# Patient Record
Sex: MALE | Race: WHITE
[De-identification: names, ages, dates, MRNs, and addresses within clinical notes are randomized per-mention and may not be internally consistent; named-entity substitution may affect disease eponyms.]

---

## 2019-07-12 ENCOUNTER — HOSPITAL ENCOUNTER (OUTPATIENT)
Dept: HOSPITAL 11 - JP.SDS | Age: 69
Discharge: HOME | End: 2019-07-12
Attending: SURGERY
Payer: MEDICARE

## 2019-07-12 DIAGNOSIS — Z86.010: ICD-10-CM

## 2019-07-12 DIAGNOSIS — E78.5: ICD-10-CM

## 2019-07-12 DIAGNOSIS — Z12.11: Primary | ICD-10-CM

## 2019-07-12 DIAGNOSIS — Z79.82: ICD-10-CM

## 2019-07-12 DIAGNOSIS — I10: ICD-10-CM

## 2019-07-12 DIAGNOSIS — M54.9: ICD-10-CM

## 2019-07-12 DIAGNOSIS — J44.9: ICD-10-CM

## 2019-07-12 DIAGNOSIS — Z79.899: ICD-10-CM

## 2019-07-12 DIAGNOSIS — Z86.73: ICD-10-CM

## 2019-07-12 NOTE — OR
DATE OF PROCEDURE:  07/12/2019

 

SURGEON:  Micheal Thakkar MD

 

PREOPERATIVE DIAGNOSIS:  History of colon polyps.

 

POSTOPERATIVE DIAGNOSES:  Unremarkable colonoscopy, history of colon polyps.

 

PROCEDURE:  Colonoscopy to the cecum.



SURGEON:  Micheal Thakkar MD. 

 

ANESTHESIA:  IV anesthesia with monitored anesthesia care.

 

INDICATION:  This 69-year-old white male is referred for a colonoscopy because 
of a history

of colon polyps.  He says his last colonoscopic exam was done 5 years ago.  I 
counseled him

for the procedure, including risks and alternatives, and he gave his informed 
consent to proceed.

 

DESCRIPTION OF PROCEDURE:  The patient was placed in the left lateral decubitus 
position.

IV anesthesia was administered by the Anesthesia Service.  Time-out was held.  
A rectal 

exam was performed, which was unremarkable.  The flexible video Olympus 
colonoscope 

was introduced through his anus, up his rectum, and out his colon all the way 
to the cecum.

Once the cecum was reached, the scope was slowly withdrawn examining the mucosa 
throughout.

No mucosal abnormalities were noted.  The scope was retroflexed in the rectum 
with the

distal rectum appearing unremarkable.  The scope was straightened and removed.  
He tolerated

the procedure well.

 

 

 

 

Micheal Thakkar MD

DD:  07/12/2019 08:15:05

DT:  07/12/2019 09:08:16

Job #:  [id number]

MTDD

## 2024-09-12 ENCOUNTER — HOSPITAL ENCOUNTER (EMERGENCY)
Dept: HOSPITAL 11 - JP.ED | Age: 74
Discharge: NURSING, REHAB OR LONG-TERM CARE | End: 2024-09-12
Payer: MEDICARE

## 2024-09-12 DIAGNOSIS — J44.9: ICD-10-CM

## 2024-09-12 DIAGNOSIS — Z79.82: ICD-10-CM

## 2024-09-12 DIAGNOSIS — Z79.899: ICD-10-CM

## 2024-09-12 DIAGNOSIS — E78.00: ICD-10-CM

## 2024-09-12 DIAGNOSIS — G45.9: Primary | ICD-10-CM

## 2024-09-12 LAB
ALBUMIN SERPL-MCNC: 3.7 G/DL (ref 3.4–5)
ALBUMIN/GLOB SERPL: 1 {RATIO} (ref 1.2–2.2)
ALP SERPL-CCNC: 71 U/L (ref 46–116)
ALT SERPL-CCNC: 32 U/L (ref 12–78)
ANION GAP SERPL CALC-SCNC: 11.4 MMOL/L (ref 5–14)
APPEARANCE UR: CLEAR
APTT PPP: 30.1 SEC (ref 21.8–27.3)
AST SERPL-CCNC: 30 U/L (ref 15–37)
BACTERIA URNS QL MICRO: (no result)
BASOPHILS # BLD AUTO: 0.01 K/UL (ref 0–0.1)
BASOPHILS NFR BLD AUTO: 0.2 % (ref 0.1–1.3)
BILIRUB SERPL-MCNC: 1 MG/DL (ref 0.2–1)
BILIRUB UR STRIP-MCNC: NEGATIVE MG/DL
BUN SERPL-MCNC: 15 MG/DL (ref 7–18)
CALCIUM SERPL-MCNC: 9.1 MG/DL (ref 8.5–10.1)
CHLORIDE SERPL-SCNC: 96 MMOL/L (ref 100–108)
CO2 SERPL-SCNC: 28 MMOL/L (ref 21–32)
COLOR UR: YELLOW
CREAT CL 24H UR+SERPL-VRATE: 55.27 ML/MIN
CREAT SERPL-MCNC: 1.1 MG/DL (ref 0.8–1.3)
EOSINOPHIL # BLD AUTO: 0 K/UL (ref 0–0.4)
EOSINOPHIL NFR BLD AUTO: 0 % (ref 0–5.4)
EPI CELLS #/AREA URNS HPF: (no result) /[HPF]
FLUAV RNA UPPER RESP QL NAA+PROBE: NEGATIVE
FLUBV RNA UPPER RESP QL NAA+PROBE: NEGATIVE
GLOBULIN SER-MCNC: 3.8 G/DL (ref 2.3–3.5)
GLUCOSE SERPL-MCNC: 133 MG/DL (ref 74–106)
GLUCOSE UR STRIP-MCNC: NEGATIVE MG/DL
HCT VFR BLD AUTO: 41.3 % (ref 38.4–49.7)
HGB BLD-MCNC: 14.3 G/DL (ref 12.9–16.9)
IMM GRANULOCYTES # BLD: 0.02 K/UL (ref 0–0.23)
IMM GRANULOCYTES NFR BLD: 0.5 % (ref 0–0.7)
INR PPP: 1.1
KETONES UR STRIP-MCNC: NEGATIVE MG/DL
LYMPHOCYTES # BLD AUTO: 0.3 K/UL (ref 0.8–3.3)
LYMPHOCYTES NFR BLD AUTO: 6.9 % (ref 11.4–47.7)
MCH RBC QN AUTO: 29.7 PG (ref 31.6–35.5)
MCHC RBC AUTO-ENTMCNC: 34.6 G/DL (ref 31.6–35.5)
MCHC RBC AUTO-ENTMCNC: 85.7 FL (ref 81.4–99)
MONOCYTES # BLD AUTO: 0.37 K/UL (ref 0.2–0.9)
MONOCYTES NFR BLD AUTO: 8.5 % (ref 3.3–12.6)
MUCOUS THREADS URNS QL MICRO: (no result)
NEUTROPHILS # BLD AUTO: 3.64 K/UL (ref 1–7.6)
NEUTROPHILS NFR BLD AUTO: 83.9 % (ref 40–78.1)
NITRITE UR QL: NEGATIVE
PH UR STRIP: 6 [PH] (ref 5–8)
PLATELET # BLD AUTO: 171 K/UL (ref 130–375)
POTASSIUM SERPL-SCNC: 4.4 MMOL/L (ref 3.6–5.2)
PROT SERPL-MCNC: 7.5 G/DL (ref 6.4–8.2)
PROT UR STRIP-MCNC: NEGATIVE MG/DL
PROTHROMBIN TIME: 11 SEC (ref 9.2–10.6)
RBC # BLD AUTO: 4.82 M/UL (ref 4.14–5.76)
RBC # URNS HPF: (no result) /ML (ref 0–5)
RBC UR QL: NEGATIVE
RSV RNA UPPER RESP QL NAA+PROBE: NEGATIVE
SARS-COV-2 RNA RESP QL NAA+PROBE: NEGATIVE
SODIUM SERPL-SCNC: 131 MMOL/L (ref 140–148)
SP GR UR STRIP: 1.01 (ref 1.01–1.03)
TROPONIN I SERPL HS-MCNC: 8.3 PG/ML (ref ?–60.3)
UROBILINOGEN UR STRIP-ACNC: 0.2 EU/DL (ref 0.2–1)
WBC # BLD AUTO: 4.3 K/UL (ref 3.2–11)
WBC UR QL: (no result) (ref 0–5)

## 2024-09-12 PROCEDURE — 93010 ELECTROCARDIOGRAM REPORT: CPT

## 2024-09-12 PROCEDURE — 99285 EMERGENCY DEPT VISIT HI MDM: CPT

## 2024-09-12 PROCEDURE — 85025 COMPLETE CBC W/AUTO DIFF WBC: CPT

## 2024-09-12 PROCEDURE — 70450 CT HEAD/BRAIN W/O DYE: CPT

## 2024-09-12 PROCEDURE — 84484 ASSAY OF TROPONIN QUANT: CPT

## 2024-09-12 PROCEDURE — 82947 ASSAY GLUCOSE BLOOD QUANT: CPT

## 2024-09-12 PROCEDURE — 0241U: CPT

## 2024-09-12 PROCEDURE — 96360 HYDRATION IV INFUSION INIT: CPT

## 2024-09-12 PROCEDURE — 93005 ELECTROCARDIOGRAM TRACING: CPT

## 2024-09-12 PROCEDURE — 83735 ASSAY OF MAGNESIUM: CPT

## 2024-09-12 PROCEDURE — 85730 THROMBOPLASTIN TIME PARTIAL: CPT

## 2024-09-12 PROCEDURE — 96361 HYDRATE IV INFUSION ADD-ON: CPT

## 2024-09-12 PROCEDURE — 81001 URINALYSIS AUTO W/SCOPE: CPT

## 2024-09-12 PROCEDURE — 80053 COMPREHEN METABOLIC PANEL: CPT

## 2024-09-12 PROCEDURE — 99284 EMERGENCY DEPT VISIT MOD MDM: CPT

## 2024-09-12 PROCEDURE — 36415 COLL VENOUS BLD VENIPUNCTURE: CPT

## 2024-09-12 PROCEDURE — 85610 PROTHROMBIN TIME: CPT

## 2024-09-12 RX ADMIN — Medication PRN ML: at 20:52

## 2025-06-28 ENCOUNTER — HOSPITAL ENCOUNTER (EMERGENCY)
Dept: HOSPITAL 11 - JP.ED | Age: 75
Discharge: HOME | End: 2025-06-28
Payer: MEDICARE

## 2025-06-28 DIAGNOSIS — J44.9: ICD-10-CM

## 2025-06-28 DIAGNOSIS — Z91.09: ICD-10-CM

## 2025-06-28 DIAGNOSIS — I10: ICD-10-CM

## 2025-06-28 DIAGNOSIS — F41.9: Primary | ICD-10-CM

## 2025-06-28 DIAGNOSIS — E78.00: ICD-10-CM

## 2025-06-28 DIAGNOSIS — Z86.73: ICD-10-CM

## 2025-06-28 DIAGNOSIS — Z79.899: ICD-10-CM

## 2025-06-30 ENCOUNTER — HOSPITAL ENCOUNTER (EMERGENCY)
Dept: HOSPITAL 11 - JP.ED | Age: 75
Discharge: HOME | End: 2025-06-30
Payer: MEDICARE

## 2025-06-30 DIAGNOSIS — Z79.01: ICD-10-CM

## 2025-06-30 DIAGNOSIS — J20.8: ICD-10-CM

## 2025-06-30 DIAGNOSIS — J44.9: ICD-10-CM

## 2025-06-30 DIAGNOSIS — I10: ICD-10-CM

## 2025-06-30 DIAGNOSIS — E78.00: ICD-10-CM

## 2025-06-30 DIAGNOSIS — Z91.09: ICD-10-CM

## 2025-06-30 DIAGNOSIS — Z79.899: ICD-10-CM

## 2025-06-30 DIAGNOSIS — R41.0: Primary | ICD-10-CM

## 2025-06-30 LAB
ALBUMIN SERPL-MCNC: 3 G/DL (ref 3.4–5)
ALBUMIN/GLOB SERPL: 0.8 {RATIO} (ref 1.2–2.2)
ALP SERPL-CCNC: 58 U/L (ref 46–116)
ALT SERPL-CCNC: 46 U/L (ref 12–78)
ANION GAP SERPL CALC-SCNC: 13.3 MMOL/L (ref 5–14)
AST SERPL-CCNC: 51 U/L (ref 15–37)
B BURGDOR IGG SERPL QL IA: POSITIVE
B BURGDOR IGM SERPL QL IA: POSITIVE
BASOPHILS # BLD AUTO: 0.01 K/UL (ref 0–0.1)
BASOPHILS NFR BLD AUTO: 0.2 % (ref 0.1–1.3)
BILIRUB SERPL-MCNC: 1 MG/DL (ref 0.2–1)
BUN SERPL-MCNC: 17 MG/DL (ref 7–18)
BUN/CREAT SERPL: (no result)
CALCIUM SERPL-MCNC: 8.5 MG/DL (ref 8.5–10.1)
CHLORIDE SERPL-SCNC: 94 MMOL/L (ref 100–108)
CO2 SERPL-SCNC: 27 MMOL/L (ref 21–32)
CREAT CL 24H UR+SERPL-VRATE: 54.41 ML/MIN
CREAT SERPL-MCNC: 1.1 MG/DL (ref 0.8–1.3)
CRP SERPL-MCNC: 7.1 MG/DL (ref ?–0.5)
EOSINOPHIL # BLD AUTO: 0 K/UL (ref 0–0.4)
EOSINOPHIL NFR BLD AUTO: 0 % (ref 0–5.4)
GLOBULIN SER-MCNC: 3.6 G/DL (ref 2.3–3.5)
GLUCOSE SERPL-MCNC: 126 MG/DL (ref 74–106)
HCT VFR BLD AUTO: 39.7 % (ref 38.4–49.7)
HGB BLD-MCNC: 13.6 G/DL (ref 12.9–16.9)
IMM GRANULOCYTES # BLD: 0.01 K/UL (ref 0–0.23)
IMM GRANULOCYTES NFR BLD: 0.2 % (ref 0–0.7)
LYMPHOCYTES # BLD AUTO: 0.31 K/UL (ref 0.8–3.3)
LYMPHOCYTES NFR BLD AUTO: 7 % (ref 11.4–47.7)
MCH RBC QN AUTO: 30.6 PG (ref 31.6–35.5)
MCHC RBC AUTO-ENTMCNC: 34.3 G/DL (ref 31.6–35.5)
MCHC RBC AUTO-ENTMCNC: 89.2 FL (ref 81.4–99)
MONOCYTES # BLD AUTO: 0.38 K/UL (ref 0.2–0.9)
MONOCYTES NFR BLD AUTO: 8.6 % (ref 3.3–12.6)
NEUTROPHILS # BLD AUTO: 3.73 K/UL (ref 1–7.6)
NEUTROPHILS NFR BLD AUTO: 84 % (ref 40–78.1)
PLATELET # BLD AUTO: 143 K/UL (ref 130–375)
POTASSIUM SERPL-SCNC: 4.3 MMOL/L (ref 3.6–5.2)
PROT SERPL-MCNC: 6.6 G/DL (ref 6.4–8.2)
RBC # BLD AUTO: 4.45 M/UL (ref 4.14–5.76)
SODIUM SERPL-SCNC: 130 MMOL/L (ref 140–148)
WBC # BLD AUTO: 4.4 K/UL (ref 3.2–11)

## 2025-07-02 LAB
A PHAGOCYTOPH DNA BLD QL NAA+PROBE: DETECTED
E CHAFFEENSIS DNA BLD QL NAA+PROBE: NOT DETECTED
EHRLICHIA EWINGII/CANIS BY PCR: NOT DETECTED
EHRLICHIA MURIS-LIKE BY PCR: NOT DETECTED

## 2025-07-03 LAB
B BURGDOR IGG SER QL IB: NEGATIVE
B BURGDOR IGM SER QL IB: POSITIVE